# Patient Record
Sex: MALE | Race: WHITE | NOT HISPANIC OR LATINO
[De-identification: names, ages, dates, MRNs, and addresses within clinical notes are randomized per-mention and may not be internally consistent; named-entity substitution may affect disease eponyms.]

---

## 2022-09-02 PROBLEM — Z00.00 ENCOUNTER FOR PREVENTIVE HEALTH EXAMINATION: Status: ACTIVE | Noted: 2022-09-02

## 2022-09-15 ENCOUNTER — APPOINTMENT (OUTPATIENT)
Dept: PODIATRY | Facility: CLINIC | Age: 47
End: 2022-09-15

## 2022-09-20 ENCOUNTER — APPOINTMENT (OUTPATIENT)
Dept: PODIATRY | Facility: CLINIC | Age: 47
End: 2022-09-20

## 2022-09-20 VITALS — HEIGHT: 68 IN | WEIGHT: 215 LBS | BODY MASS INDEX: 32.58 KG/M2

## 2022-09-20 DIAGNOSIS — Z83.3 FAMILY HISTORY OF DIABETES MELLITUS: ICD-10-CM

## 2022-09-20 DIAGNOSIS — Q66.212: ICD-10-CM

## 2022-09-20 PROCEDURE — 99203 OFFICE O/P NEW LOW 30 MIN: CPT

## 2022-09-20 PROCEDURE — 73630 X-RAY EXAM OF FOOT: CPT | Mod: LT

## 2022-09-22 NOTE — PHYSICAL EXAM
[General Appearance - Alert] : alert [General Appearance - In No Acute Distress] : in no acute distress [2+] : left foot dorsalis pedis 2+ [Sensation] : the sensory exam was normal to light touch and pinprick [No Focal Deficits] : no focal deficits [Deep Tendon Reflexes (DTR)] : deep tendon reflexes were 2+ and symmetric [Motor Exam] : the motor exam was normal [Oriented To Time, Place, And Person] : oriented to person, place, and time [Impaired Insight] : insight and judgment were intact [Affect] : the affect was normal [Ankle Swelling (On Exam)] : not present [Varicose Veins Of Lower Extremities] : not present [] : not present [Delayed in the Right Toes] : capillary refills normal in right toes [Delayed in the Left Toes] : capillary refills normal in the left toes [FreeTextEntry3] : CFT: 3 seconds x 10.  Temperature gradient: warm to cool. [de-identified] : Forefoot varus, fully compensated.  HAV with bunion.  Metatarsus primus adductus.  No significant track-bound ROM of the 1st MPJ.  Mild hypermobility at the 1st met. cuneiform joint.  Non-crepitant, non-painful ROM. [FreeTextEntry1] : Bursitis overlying the bunion.   [Vibration Dec.] : normal vibratory sensation at the level of the toes

## 2022-09-22 NOTE — ASSESSMENT
[FreeTextEntry1] : Impression: Painful HAV with bunion.  Metatarsus primus adductus with pain that is getting worse.  Hallux valgus (M20.1).  Difficulty walking (R26.2).  \par \par At this time, patient wants to proceed with surgical intervention as conservative measures have failed to relieve his symptomatology.    \par Will schedule for a Lapidus type bunionectomy, left foot.  \par Explained the procedure as well as the risks, complications and alternatives.  Spent a significant amount of time with the patient and his son going over the procedure.  Any questions or problems, patient is to contact the office.\par

## 2022-09-22 NOTE — HISTORY OF PRESENT ILLNESS
[Sneakers] : osorio [FreeTextEntry1] : Patient presents today with a painful bunion, left foot that has been present for years and getting progressively worse.  He did see Dr. Cristina in the past and had conservative treatment without relief of symptomatology.  He continues to have worsening pain, deformity and difficulty ambulating and wearing shoe gear.

## 2022-09-22 NOTE — PROCEDURE
[FreeTextEntry1] : X-rays: (DP, medial oblique, lateral - left foot) There is increased intermetatarsal angle of approximately 16 degrees.  Increased hallux abduction of 35 degrees.

## 2022-09-27 ENCOUNTER — RESULT REVIEW (OUTPATIENT)
Age: 47
End: 2022-09-27

## 2022-09-27 ENCOUNTER — OUTPATIENT (OUTPATIENT)
Dept: OUTPATIENT SERVICES | Facility: HOSPITAL | Age: 47
LOS: 1 days | End: 2022-09-27
Payer: COMMERCIAL

## 2022-09-27 ENCOUNTER — APPOINTMENT (OUTPATIENT)
Dept: PODIATRY | Facility: CLINIC | Age: 47
End: 2022-09-27

## 2022-09-27 VITALS
RESPIRATION RATE: 16 BRPM | OXYGEN SATURATION: 98 % | HEART RATE: 71 BPM | WEIGHT: 214.95 LBS | DIASTOLIC BLOOD PRESSURE: 79 MMHG | SYSTOLIC BLOOD PRESSURE: 122 MMHG | HEIGHT: 68 IN | TEMPERATURE: 98 F

## 2022-09-27 VITALS — BODY MASS INDEX: 32.58 KG/M2 | HEIGHT: 68 IN | WEIGHT: 215 LBS

## 2022-09-27 DIAGNOSIS — Z01.818 ENCOUNTER FOR OTHER PREPROCEDURAL EXAMINATION: ICD-10-CM

## 2022-09-27 DIAGNOSIS — M20.12 HALLUX VALGUS (ACQUIRED), LEFT FOOT: ICD-10-CM

## 2022-09-27 DIAGNOSIS — Z98.890 OTHER SPECIFIED POSTPROCEDURAL STATES: Chronic | ICD-10-CM

## 2022-09-27 PROCEDURE — G0463: CPT

## 2022-09-27 PROCEDURE — 73630 X-RAY EXAM OF FOOT: CPT | Mod: 26,LT

## 2022-09-27 PROCEDURE — 99213 OFFICE O/P EST LOW 20 MIN: CPT

## 2022-09-27 PROCEDURE — 73630 X-RAY EXAM OF FOOT: CPT

## 2022-09-27 NOTE — H&P PST ADULT - SKIN
warm and dry/color normal/normal/no rashes/no ulcers/no subcutaneous nodules/no induration multiple tattoos/warm and dry/color normal/normal/no rashes/no ulcers/no subcutaneous nodules/no induration

## 2022-09-27 NOTE — H&P PST ADULT - GASTROINTESTINAL
negative soft/nontender/nondistended/normal active bowel sounds/no guarding/no rigidity/no organomegaly/no palpable moody/no masses palpable

## 2022-09-27 NOTE — H&P PST ADULT - MUSCULOSKELETAL
details… no joint swelling/decreased ROM due to pain/decreased strength joint swelling/joint erythema

## 2022-09-27 NOTE — H&P PST ADULT - CARDIOVASCULAR
regular rate and rhythm/S1 S2 present/normal PMI details… regular rate and rhythm/S1 S2 present/no murmur/normal PMI

## 2022-09-27 NOTE — H&P PST ADULT - NSANTHOSAYNRD_GEN_A_CORE
No. NIMO screening performed.  STOP BANG Legend: 0-2 = LOW Risk; 3-4 = INTERMEDIATE Risk; 5-8 = HIGH Risk

## 2022-09-27 NOTE — H&P PST ADULT - GENITOURINARY MALE
normal external genitalia/no hernia/no discharge/no mass/no tenderness/no ulcer/normal/no penile lesion/no palpable testicular mass/no scrotal mass exam deferred/normal external genitalia

## 2022-09-27 NOTE — H&P PST ADULT - ACTIVITY
walks 4 blocks and climbs 2 flights of stairs, except for left foot pain, denies exertional dyspnea with activities

## 2022-09-27 NOTE — H&P PST ADULT - HISTORY OF PRESENT ILLNESS
45 y/o male with no significant PMH is diagnosed with hallux valgus (acquired) left foot. He is scheduled for left Lapidus bunionectomy 10/3/2022

## 2022-09-27 NOTE — H&P PST ADULT - MS GEN HX ROS MEA POS PC
pain in left foot with ambulation/joint swelling/joint pain/stiffness pain in left foot with extensive ambulation/joint swelling/joint pain/stiffness

## 2022-09-27 NOTE — H&P PST ADULT - ASSESSMENT
47 y/o male with no significant PMH is diagnosed with hallux valgus (acquired) left foot  STOP BANG Score is 2

## 2022-09-27 NOTE — H&P PST ADULT - NSICDXFAMILYHX_GEN_ALL_CORE_FT
FAMILY HISTORY:  Father  Still living? No  FH: type 2 diabetes, Age at diagnosis: Age Unknown    Mother  Still living? Yes, Estimated age: Age Unknown  FH: type 2 diabetes, Age at diagnosis: Age Unknown

## 2022-09-27 NOTE — H&P PST ADULT - RESPIRATORY
clear to auscultation bilaterally clear to auscultation bilaterally/no wheezes/no rales/no rhonchi/no respiratory distress/no use of accessory muscles/no subcutaneous emphysema/airway patent/breath sounds equal

## 2022-09-27 NOTE — H&P PST ADULT - PROBLEM SELECTOR PLAN 1
Scheduled for left Lapidus bunionectomy 10/3/2022  Preoperative instructions discussed and given to patient.   Instructed to schedule COVID 19 test 3 days prior to surgery.   Discussed preprocedure skin preparation using  chlorhexidine gluconate 4% solution three days prior to  surgery.  Instructed patient to avoid aspirin and aspirin products, over the counter medications such as vitamins and herbal medications, one week prior to surgery.  Take Tylenol as needed for pain  Patient verbalized understanding of instructions

## 2022-09-29 NOTE — HISTORY OF PRESENT ILLNESS
[Sneakers] : osorio [FreeTextEntry1] : Patient presents today with his wife for surgical consultation regarding his left bunion.  He is scheduled for October 3, 2022.\par The procedure was explained to them in detail, spending approximately 30 minutes with the patient and his wife, reviewing the risks, complications and alternatives for a left bunionectomy, Lapidus type with plate and screws.  \par Discussed the preoperative expectations, as well as operation to be performed, and postoperative expectations.  We discussed risks, benefits and options with risks to include, but not limited to, delayed healing, postoperative infection, chronic swelling and numbness, as well as under and over correction of the deformity, permanent numbness, stiffness, chronic pain, as well as a need for further surgery.  \par We discussed anesthesia to be utilized and expectations postoperatively of limited or non-weight-bearing activities, use of crutches and/or walker, elevation and follow-up visits. \par Surgical consent was then signed and initialed by both the patient and myself; consent was witnessed by his wife.  Patient was allowed to ask all questions.  All questions were answered in detail. \par Patient has a clear understanding of the procedures to be performed.  Any questions or problems prior to that time, patient is to contact this office for further discussion.\par

## 2022-10-02 ENCOUNTER — TRANSCRIPTION ENCOUNTER (OUTPATIENT)
Age: 47
End: 2022-10-02

## 2022-10-03 ENCOUNTER — TRANSCRIPTION ENCOUNTER (OUTPATIENT)
Age: 47
End: 2022-10-03

## 2022-10-03 ENCOUNTER — RESULT REVIEW (OUTPATIENT)
Age: 47
End: 2022-10-03

## 2022-10-03 ENCOUNTER — APPOINTMENT (OUTPATIENT)
Dept: PODIATRY | Facility: HOSPITAL | Age: 47
End: 2022-10-03

## 2022-10-03 ENCOUNTER — OUTPATIENT (OUTPATIENT)
Dept: OUTPATIENT SERVICES | Facility: HOSPITAL | Age: 47
LOS: 1 days | End: 2022-10-03
Payer: COMMERCIAL

## 2022-10-03 VITALS
TEMPERATURE: 98 F | HEART RATE: 62 BPM | OXYGEN SATURATION: 98 % | SYSTOLIC BLOOD PRESSURE: 122 MMHG | DIASTOLIC BLOOD PRESSURE: 76 MMHG | HEIGHT: 68 IN | RESPIRATION RATE: 16 BRPM | WEIGHT: 214.95 LBS

## 2022-10-03 VITALS
SYSTOLIC BLOOD PRESSURE: 113 MMHG | DIASTOLIC BLOOD PRESSURE: 72 MMHG | RESPIRATION RATE: 14 BRPM | HEART RATE: 68 BPM | OXYGEN SATURATION: 97 % | TEMPERATURE: 98 F

## 2022-10-03 DIAGNOSIS — Z98.890 OTHER SPECIFIED POSTPROCEDURAL STATES: Chronic | ICD-10-CM

## 2022-10-03 DIAGNOSIS — M20.12 HALLUX VALGUS (ACQUIRED), LEFT FOOT: ICD-10-CM

## 2022-10-03 PROCEDURE — 73630 X-RAY EXAM OF FOOT: CPT

## 2022-10-03 PROCEDURE — C1713: CPT

## 2022-10-03 PROCEDURE — 28297 COR HLX VLGS JT ARTHRD: CPT | Mod: LT

## 2022-10-03 PROCEDURE — 97161 PT EVAL LOW COMPLEX 20 MIN: CPT

## 2022-10-03 PROCEDURE — C1769: CPT

## 2022-10-03 PROCEDURE — 73630 X-RAY EXAM OF FOOT: CPT | Mod: 26,LT

## 2022-10-03 DEVICE — SCREW CORT LO PROF 3.5X26MM: Type: IMPLANTABLE DEVICE | Status: FUNCTIONAL

## 2022-10-03 DEVICE — SCREW CORT LO PROF 3.5X14MM: Type: IMPLANTABLE DEVICE | Status: FUNCTIONAL

## 2022-10-03 DEVICE — GUIDEWIRE UNTHREADED 1.4X150MM: Type: IMPLANTABLE DEVICE | Status: FUNCTIONAL

## 2022-10-03 DEVICE — IMPLANTABLE DEVICE: Type: IMPLANTABLE DEVICE | Status: FUNCTIONAL

## 2022-10-03 DEVICE — SCREW CORT LO PROF 3.5X16MM: Type: IMPLANTABLE DEVICE | Status: FUNCTIONAL

## 2022-10-03 DEVICE — PIN FIXATION TEMP 1.1MM SM: Type: IMPLANTABLE DEVICE | Status: FUNCTIONAL

## 2022-10-03 RX ORDER — OXYCODONE HYDROCHLORIDE 5 MG/1
5 TABLET ORAL ONCE
Refills: 0 | Status: DISCONTINUED | OUTPATIENT
Start: 2022-10-03 | End: 2022-10-03

## 2022-10-03 RX ORDER — FENTANYL CITRATE 50 UG/ML
50 INJECTION INTRAVENOUS
Refills: 0 | Status: DISCONTINUED | OUTPATIENT
Start: 2022-10-03 | End: 2022-10-03

## 2022-10-03 RX ORDER — FENTANYL CITRATE 50 UG/ML
25 INJECTION INTRAVENOUS
Refills: 0 | Status: DISCONTINUED | OUTPATIENT
Start: 2022-10-03 | End: 2022-10-03

## 2022-10-03 RX ORDER — SODIUM CHLORIDE 9 MG/ML
3 INJECTION INTRAMUSCULAR; INTRAVENOUS; SUBCUTANEOUS EVERY 8 HOURS
Refills: 0 | Status: DISCONTINUED | OUTPATIENT
Start: 2022-10-03 | End: 2022-10-03

## 2022-10-03 RX ORDER — ONDANSETRON 8 MG/1
4 TABLET, FILM COATED ORAL ONCE
Refills: 0 | Status: DISCONTINUED | OUTPATIENT
Start: 2022-10-03 | End: 2022-10-03

## 2022-10-03 RX ORDER — SODIUM CHLORIDE 9 MG/ML
1000 INJECTION, SOLUTION INTRAVENOUS
Refills: 0 | Status: DISCONTINUED | OUTPATIENT
Start: 2022-10-03 | End: 2022-10-03

## 2022-10-03 RX ADMIN — OXYCODONE HYDROCHLORIDE 5 MILLIGRAM(S): 5 TABLET ORAL at 15:30

## 2022-10-03 RX ADMIN — FENTANYL CITRATE 50 MICROGRAM(S): 50 INJECTION INTRAVENOUS at 14:14

## 2022-10-03 RX ADMIN — OXYCODONE HYDROCHLORIDE 5 MILLIGRAM(S): 5 TABLET ORAL at 15:00

## 2022-10-03 RX ADMIN — FENTANYL CITRATE 50 MICROGRAM(S): 50 INJECTION INTRAVENOUS at 14:55

## 2022-10-03 NOTE — ASU DISCHARGE PLAN (ADULT/PEDIATRIC) - NS MD DC FALL RISK RISK
For information on Fall & Injury Prevention, visit: [email or web address] OR  [email or web address] OR  https://www.cdc.gov/steadi/patient.html

## 2022-10-03 NOTE — ASU DISCHARGE PLAN (ADULT/PEDIATRIC) - C. MAKE IMPORTANT PERSONAL OR BUSINESS DECISIONS
S/W patient to scheduled injection. Patient would like to confirm if this will be done at [facility] or in office.  Staff will confirm with Dr. Geni Contreras before proceeding to schedule Statement Selected

## 2022-10-04 PROBLEM — Z78.9 OTHER SPECIFIED HEALTH STATUS: Chronic | Status: ACTIVE | Noted: 2022-09-27

## 2022-10-12 ENCOUNTER — APPOINTMENT (OUTPATIENT)
Dept: PODIATRY | Facility: CLINIC | Age: 47
End: 2022-10-12

## 2022-10-13 ENCOUNTER — APPOINTMENT (OUTPATIENT)
Dept: PODIATRY | Facility: CLINIC | Age: 47
End: 2022-10-13

## 2022-10-13 PROCEDURE — 29515 APPLICATION SHORT LEG SPLINT: CPT | Mod: LT,58

## 2022-10-14 NOTE — REASON FOR VISIT
[Post Operative Visit] : a post operative visit for [Foot Pain] : foot pain [Family Member] : family member

## 2022-10-17 NOTE — HISTORY OF PRESENT ILLNESS
[Post-op Sandals] : post-op sandals [Crutches] : crutches [FreeTextEntry1] : Patient presents today with son, postoperative evaluation Lapidus left foot performed last week on 10/03/22.  He is doing well.  Incision is dry and intact.  Minimal swelling.  \par

## 2022-10-17 NOTE — ASSESSMENT
[FreeTextEntry1] : Impression: Z48.89\par \par Treatment: Dry, sterile compressive dressing applied.  Posterior splint was applied.  Patient was given home care instructions.  Will reevaluate in one week.  Any questions or problems, patient is to contact the office.

## 2022-10-21 ENCOUNTER — APPOINTMENT (OUTPATIENT)
Dept: PODIATRY | Facility: CLINIC | Age: 47
End: 2022-10-21
Payer: COMMERCIAL

## 2022-10-21 DIAGNOSIS — Z48.89 ENCOUNTER FOR OTHER SPECIFIED SURGICAL AFTERCARE: ICD-10-CM

## 2022-10-21 DIAGNOSIS — M20.12 HALLUX VALGUS (ACQUIRED), LEFT FOOT: ICD-10-CM

## 2022-10-21 PROCEDURE — 29515 APPLICATION SHORT LEG SPLINT: CPT | Mod: LT,58

## 2022-10-24 PROBLEM — M20.12 HALLUX VALGUS OF LEFT FOOT: Status: ACTIVE | Noted: 2022-09-20

## 2022-10-25 PROBLEM — Z48.89 ENCOUNTER FOR SURGICAL FOLLOW-UP CARE: Status: ACTIVE | Noted: 2022-10-24

## 2022-10-25 NOTE — ASSESSMENT
[FreeTextEntry1] : \par Treatment: I removed sutures. I put steri-strips on. I put the patient into a [place] compression dressing with fiberglass posterior splint. He is going to rest and be non-weight-bearing. Keep it dry and continue crutches, non-weight-bearing. He could follow-up with Dr. Lombardi next week and a determination as to treatment going forward will be made.

## 2022-10-25 NOTE — HISTORY OF PRESENT ILLNESS
[Cast] : a cast [Crutches] : crutches [FreeTextEntry1] : Patient presents today S/P Lapidus procedure. He is 2 weeks postop. Dr. Lombardi is away and I am seeing the patient. He has no complaints. He is progressing nicely.\par \par

## 2022-10-25 NOTE — PHYSICAL EXAM
[2+] : left foot dorsalis pedis 2+ [Sensation] : the sensory exam was normal to light touch and pinprick [No Focal Deficits] : no focal deficits [Deep Tendon Reflexes (DTR)] : deep tendon reflexes were 2+ and symmetric [Motor Exam] : the motor exam was normal [Delayed in the Right Toes] : capillary refills normal in right toes [Delayed in the Left Toes] : capillary refills normal in the left toes [de-identified] :  He has nice correction with a toe in good position and good 1st MPJ ROM. He has a well coapted incision with mild to moderate swelling.  [FreeTextEntry1] : He has a nicely coapted incision. There are no signs of erythema, drainage or infection.

## 2022-10-26 ENCOUNTER — APPOINTMENT (OUTPATIENT)
Dept: PODIATRY | Facility: CLINIC | Age: 47
End: 2022-10-26

## 2022-10-26 VITALS — BODY MASS INDEX: 32.74 KG/M2 | WEIGHT: 216 LBS | HEIGHT: 68 IN

## 2022-10-26 PROCEDURE — 73630 X-RAY EXAM OF FOOT: CPT | Mod: LT

## 2022-10-26 RX ORDER — OXYCODONE AND ACETAMINOPHEN 5; 325 MG/1; MG/1
5-325 TABLET ORAL
Qty: 25 | Refills: 0 | Status: DISCONTINUED | COMMUNITY
Start: 2022-10-03 | End: 2022-10-26

## 2022-10-27 NOTE — ASSESSMENT
[Verbal] : verbal [Patient] : patient [Spouse] : spouse [Good - alert, interested, motivated] : Good - alert, interested, motivated [Dressing changes] : dressing changes [Signs and symptoms of infection] : sign and symptoms of infection [Off-loading] : off-loading [FreeTextEntry1] : Impression: Z48.89\par \par Treatment: Light dressing applied.  Patient was placed into a Cam boot.  He can put light weight bearing on it.  Patient was given home care instructions.  Will reevaluate in one week.   Any questions or problems, patient is to contact the office.

## 2022-10-27 NOTE — PHYSICAL EXAM
[2+] : left foot dorsalis pedis 2+ [Sensation] : the sensory exam was normal to light touch and pinprick [No Focal Deficits] : no focal deficits [Deep Tendon Reflexes (DTR)] : deep tendon reflexes were 2+ and symmetric [Motor Exam] : the motor exam was normal [Ankle Swelling (On Exam)] : not present [Varicose Veins Of Lower Extremities] : not present [] : not present [Delayed in the Right Toes] : capillary refills normal in right toes [Delayed in the Left Toes] : capillary refills normal in the left toes [FreeTextEntry3] : CFT: 3 seconds x 10.  Temperature gradient: warm to cool. [de-identified] :  He has nice correction with a toe in good position and good 1st MPJ ROM. He has a well coapted incision with mild to moderate swelling.  [FreeTextEntry1] : Mild dehiscence proximally with sanguinous drainage, no signs of infection, edematous medial foot.

## 2022-10-27 NOTE — HISTORY OF PRESENT ILLNESS
[Cast] : a cast [Crutches] : crutches [FreeTextEntry1] : Patient presents today for reevaluation of left  Lapidus performed on 10/02/22.  He is doing well.  The incision has a very superficial dehiscence with SteriStrips on it.  He is to keep it dry.  \par \par

## 2022-11-17 ENCOUNTER — APPOINTMENT (OUTPATIENT)
Dept: PODIATRY | Facility: CLINIC | Age: 47
End: 2022-11-17
